# Patient Record
Sex: MALE | Race: ASIAN | NOT HISPANIC OR LATINO
[De-identification: names, ages, dates, MRNs, and addresses within clinical notes are randomized per-mention and may not be internally consistent; named-entity substitution may affect disease eponyms.]

---

## 2021-05-23 ENCOUNTER — RX ONLY (RX ONLY)
Age: 21
End: 2021-05-23

## 2022-12-01 ENCOUNTER — RX ONLY (RX ONLY)
Age: 22
End: 2022-12-01

## 2023-01-01 ENCOUNTER — RX ONLY (RX ONLY)
Age: 23
End: 2023-01-01

## 2023-02-16 ENCOUNTER — RX ONLY (RX ONLY)
Age: 23
End: 2023-02-16

## 2023-02-23 ENCOUNTER — APPOINTMENT (OUTPATIENT)
Dept: URBAN - NONMETROPOLITAN AREA CLINIC 45 | Age: 23
Setting detail: DERMATOLOGY
End: 2023-02-23

## 2023-02-23 DIAGNOSIS — L70.0 ACNE VULGARIS: ICD-10-CM

## 2023-02-23 DIAGNOSIS — L259 CONTACT DERMATITIS AND OTHER ECZEMA, UNSPECIFIED CAUSE: ICD-10-CM

## 2023-02-23 PROBLEM — H01.134 ECZEMATOUS DERMATITIS OF LEFT UPPER EYELID: Status: ACTIVE | Noted: 2023-02-23

## 2023-02-23 PROBLEM — H01.131 ECZEMATOUS DERMATITIS OF RIGHT UPPER EYELID: Status: ACTIVE | Noted: 2023-02-23

## 2023-02-23 PROBLEM — L30.8 OTHER SPECIFIED DERMATITIS: Status: ACTIVE | Noted: 2023-02-23

## 2023-02-23 PROCEDURE — 99203 OFFICE O/P NEW LOW 30 MIN: CPT

## 2023-02-23 PROCEDURE — OTHER MIPS QUALITY: OTHER

## 2023-02-23 PROCEDURE — OTHER PRESCRIPTION MEDICATION MANAGEMENT: OTHER

## 2023-02-23 PROCEDURE — OTHER PRESCRIPTION: OTHER

## 2023-02-23 RX ORDER — HYDROCORTISONE 25 MG/G
APPLY OINTMENT TOPICAL AS DIRECTED
Qty: 28.35 | Refills: 3 | Status: ERX | COMMUNITY
Start: 2023-02-23

## 2023-02-23 RX ORDER — TRIAMCINOLONE ACETONIDE 1 MG/G
APPLY CREAM TOPICAL AS DIRECTED
Qty: 454 | Refills: 3 | Status: ERX | COMMUNITY
Start: 2023-02-23

## 2023-02-23 ASSESSMENT — LOCATION ZONE DERM
LOCATION ZONE: TRUNK
LOCATION ZONE: LEG
LOCATION ZONE: HAND
LOCATION ZONE: EYELID

## 2023-02-23 ASSESSMENT — LOCATION DETAILED DESCRIPTION DERM
LOCATION DETAILED: LEFT PROXIMAL CALF
LOCATION DETAILED: LEFT ULNAR DORSAL HAND
LOCATION DETAILED: SUPERIOR LUMBAR SPINE
LOCATION DETAILED: RIGHT POPLITEAL SKIN
LOCATION DETAILED: RIGHT RADIAL DORSAL HAND
LOCATION DETAILED: RIGHT LATERAL SUPERIOR EYELID
LOCATION DETAILED: LEFT LATERAL SUPERIOR EYELID

## 2023-02-23 ASSESSMENT — LOCATION SIMPLE DESCRIPTION DERM
LOCATION SIMPLE: LEFT CALF
LOCATION SIMPLE: RIGHT SUPERIOR EYELID
LOCATION SIMPLE: RIGHT POPLITEAL SKIN
LOCATION SIMPLE: LEFT HAND
LOCATION SIMPLE: RIGHT HAND
LOCATION SIMPLE: LEFT SUPERIOR EYELID
LOCATION SIMPLE: LOWER BACK

## 2023-02-23 NOTE — PROCEDURE: PRESCRIPTION MEDICATION MANAGEMENT
Render In Strict Bullet Format?: No
Plan: Mild cleansers (Continue Dove fragrance free soap)\\nPlan vaseline or aquaphor as emollient for eyelid irritation \\nHydrocortisone 2.5% ointment apply to eyelid irritation twice a day x1 week then only 1-2 times a week as needed \\nTriamcinolone cream apply to eczema on trunk/extremities twice a day x2 weeks then apply twice a day as needed on Saturday/Sunday\\nProtopic (Tacrolimus) ointment apply Monday-Friday twice a day as needed (start after trial of Triamcinolone cream)
Detail Level: Zone
Plan: Increase emollients (Continue Cerave cream)\\nContinue panoxyl 10% wash daily \\nContinue curology compounded topical cream as directed

## 2023-02-23 NOTE — HPI: OTHER
Condition:: Eczema
Please Describe Your Condition:: is a new patient who is being seen for a chief complaint of Eczema . Located the hands, behind the knees and eyelids. Pt has a history of childhood eczema. Pt has tried HCC 1% and Triamcinolone 0.1% cream.